# Patient Record
Sex: FEMALE | Race: WHITE | NOT HISPANIC OR LATINO | URBAN - METROPOLITAN AREA
[De-identification: names, ages, dates, MRNs, and addresses within clinical notes are randomized per-mention and may not be internally consistent; named-entity substitution may affect disease eponyms.]

---

## 2020-03-03 ENCOUNTER — OFFICE (OUTPATIENT)
Dept: URBAN - METROPOLITAN AREA CLINIC 64 | Facility: CLINIC | Age: 40
End: 2020-03-03
Payer: COMMERCIAL

## 2020-03-03 VITALS
WEIGHT: 182 LBS | HEIGHT: 66 IN | SYSTOLIC BLOOD PRESSURE: 122 MMHG | HEART RATE: 110 BPM | DIASTOLIC BLOOD PRESSURE: 86 MMHG

## 2020-03-03 DIAGNOSIS — F11.20 OPIOID DEPENDENCE, UNCOMPLICATED: ICD-10-CM

## 2020-03-03 DIAGNOSIS — R19.5 OTHER FECAL ABNORMALITIES: ICD-10-CM

## 2020-03-03 DIAGNOSIS — Z72.0 TOBACCO USE: ICD-10-CM

## 2020-03-03 DIAGNOSIS — F19.10 OTHER PSYCHOACTIVE SUBSTANCE ABUSE, UNCOMPLICATED: ICD-10-CM

## 2020-03-03 DIAGNOSIS — K21.9 GASTRO-ESOPHAGEAL REFLUX DISEASE WITHOUT ESOPHAGITIS: ICD-10-CM

## 2020-03-03 DIAGNOSIS — B18.2 CHRONIC VIRAL HEPATITIS C: ICD-10-CM

## 2020-03-03 DIAGNOSIS — R11.2 NAUSEA WITH VOMITING, UNSPECIFIED: ICD-10-CM

## 2020-03-03 DIAGNOSIS — R10.11 RIGHT UPPER QUADRANT PAIN: ICD-10-CM

## 2020-03-03 PROCEDURE — 99203 OFFICE O/P NEW LOW 30 MIN: CPT | Performed by: NURSE PRACTITIONER

## 2020-03-03 RX ORDER — LUBIPROSTONE 24 UG/1
48 CAPSULE, GELATIN COATED ORAL
Qty: 180 | Refills: 3 | Status: COMPLETED
Start: 2020-03-03 | End: 2021-05-19

## 2020-03-03 RX ORDER — ONDANSETRON HYDROCHLORIDE 4 MG/1
16 TABLET, FILM COATED ORAL
Qty: 40 | Refills: 1 | Status: ACTIVE
Start: 2020-03-03

## 2020-03-03 RX ORDER — OMEPRAZOLE 40 MG/1
40 CAPSULE, DELAYED RELEASE ORAL
Qty: 30 | Refills: 1 | Status: COMPLETED
Start: 2020-03-03 | End: 2021-05-19

## 2020-03-03 RX ORDER — SORBITOL SOLUTION 70 %
SOLUTION, ORAL MISCELLANEOUS
Qty: 100 | Refills: 0 | Status: COMPLETED
Start: 2020-03-03 | End: 2021-05-19

## 2020-05-07 ENCOUNTER — TELEHEALTH PROVIDED OTHER THAN IN PATIENT'S HOME (OUTPATIENT)
Dept: URBAN - METROPOLITAN AREA TELEHEALTH 4 | Facility: TELEHEALTH | Age: 40
End: 2020-05-07
Payer: COMMERCIAL

## 2020-05-07 VITALS — HEIGHT: 66 IN

## 2020-05-07 DIAGNOSIS — K74.69 OTHER CIRRHOSIS OF LIVER: ICD-10-CM

## 2020-05-07 DIAGNOSIS — B18.2 CHRONIC VIRAL HEPATITIS C: ICD-10-CM

## 2020-05-07 DIAGNOSIS — R74.8 ABNORMAL LEVELS OF OTHER SERUM ENZYMES: ICD-10-CM

## 2020-05-07 DIAGNOSIS — R60.0 LOCALIZED EDEMA: ICD-10-CM

## 2020-05-07 DIAGNOSIS — R53.83 OTHER FATIGUE: ICD-10-CM

## 2020-05-07 DIAGNOSIS — F19.10 OTHER PSYCHOACTIVE SUBSTANCE ABUSE, UNCOMPLICATED: ICD-10-CM

## 2020-05-07 DIAGNOSIS — R11.2 NAUSEA WITH VOMITING, UNSPECIFIED: ICD-10-CM

## 2020-05-07 PROCEDURE — 99213 OFFICE O/P EST LOW 20 MIN: CPT | Mod: 95 | Performed by: INTERNAL MEDICINE

## 2020-05-07 RX ORDER — SPIRONOLACTONE 50 MG/1
TABLET, FILM COATED ORAL
Qty: 90 | Refills: 5 | Status: ACTIVE
Start: 2020-05-07

## 2020-05-07 RX ORDER — VELPATASVIR AND SOFOSBUVIR 100; 400 MG/1; MG/1
TABLET, FILM COATED ORAL
Qty: 84 | Refills: 0 | Status: COMPLETED
Start: 2020-05-07 | End: 2021-05-19

## 2021-05-19 ENCOUNTER — OFFICE (OUTPATIENT)
Dept: URBAN - METROPOLITAN AREA CLINIC 64 | Facility: CLINIC | Age: 41
End: 2021-05-19
Payer: COMMERCIAL

## 2021-05-19 VITALS
WEIGHT: 212 LBS | HEIGHT: 66 IN | HEART RATE: 81 BPM | DIASTOLIC BLOOD PRESSURE: 80 MMHG | SYSTOLIC BLOOD PRESSURE: 117 MMHG

## 2021-05-19 DIAGNOSIS — R14.0 ABDOMINAL DISTENSION (GASEOUS): ICD-10-CM

## 2021-05-19 DIAGNOSIS — K21.9 GASTRO-ESOPHAGEAL REFLUX DISEASE WITHOUT ESOPHAGITIS: ICD-10-CM

## 2021-05-19 DIAGNOSIS — R10.11 RIGHT UPPER QUADRANT PAIN: ICD-10-CM

## 2021-05-19 DIAGNOSIS — F19.10 OTHER PSYCHOACTIVE SUBSTANCE ABUSE, UNCOMPLICATED: ICD-10-CM

## 2021-05-19 DIAGNOSIS — B18.2 CHRONIC VIRAL HEPATITIS C: ICD-10-CM

## 2021-05-19 DIAGNOSIS — R11.2 NAUSEA WITH VOMITING, UNSPECIFIED: ICD-10-CM

## 2021-05-19 DIAGNOSIS — K59.00 CONSTIPATION, UNSPECIFIED: ICD-10-CM

## 2021-05-19 PROCEDURE — 99214 OFFICE O/P EST MOD 30 MIN: CPT | Performed by: NURSE PRACTITIONER

## 2021-05-19 RX ORDER — ONDANSETRON 8 MG/1
24 TABLET, ORALLY DISINTEGRATING ORAL
Qty: 60 | Refills: 6 | Status: ACTIVE
Start: 2021-05-19

## 2021-06-07 ENCOUNTER — OFFICE (OUTPATIENT)
Dept: URBAN - METROPOLITAN AREA CLINIC 64 | Facility: CLINIC | Age: 41
End: 2021-06-07

## 2024-08-16 ENCOUNTER — OFFICE VISIT (OUTPATIENT)
Dept: ORTHOPEDIC SURGERY | Facility: CLINIC | Age: 44
End: 2024-08-16
Payer: OTHER MISCELLANEOUS

## 2024-08-16 VITALS — HEART RATE: 106 BPM | BODY MASS INDEX: 37.57 KG/M2 | HEIGHT: 67 IN | WEIGHT: 239.4 LBS

## 2024-08-16 DIAGNOSIS — M25.561 ACUTE PAIN OF RIGHT KNEE: ICD-10-CM

## 2024-08-16 DIAGNOSIS — M23.91 INTERNAL DERANGEMENT OF RIGHT KNEE: Primary | ICD-10-CM

## 2024-08-16 RX ORDER — IBUPROFEN 600 MG/1
600 TABLET ORAL EVERY 8 HOURS PRN
Qty: 90 TABLET | Refills: 0 | Status: SHIPPED | OUTPATIENT
Start: 2024-08-16 | End: 2024-09-15

## 2024-08-16 NOTE — PROGRESS NOTES
"NEW VISIT    Patient: Sangita Garcia  ?  YOB: 1980    MRN: [id number]  ?  Chief Complaint   Patient presents with    Right Knee - Initial Evaluation, Pain     Pain 5  DOI 5/25/2024      ?  HPI: Patient is a 43-year-old female presenting today for work related injury.  Work comp injury.  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  States felt a pop within her right knee, with initial swelling and inability to ambulate.  Subsequently evaluated at ER with x-rays obtained and negative for acute osseous abnormality.  Diagnosed with knee strain and advised conservative management.  Despite conservative treatment, including over-the-counter compression knee brace, prescription oral ibuprofen 800 mg every 6-8 hours, regular RICE treatment and activity modification, she has continued to have significant anterior and medial knee pain, that is worse with weightbearing activity, changing directions or squatting.  She is unable to kneel fully due to posterior medial knee pain.  Has associated repetitive popping and catching within this area as well.  Continues to have intermittent knee swelling, and substantial knee stiffness with difficulty in knee flexion past 90 degrees.  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  No prior knee surgeries.  No prior knee injuries before this event      Allergies:   Allergies   Allergen Reactions    Buprenorphine-Naloxone Hives       Past Medical History:   Diagnosis Date    Cholelithiases 06/2021    Constipation     GERD (gastroesophageal reflux disease)     Hepatitis C     treated 11/2020    History of MRSA infection 2004    arm wound \"started as boil\"    " "Insomnia     Nausea 06/2021     Past Surgical History:   Procedure Laterality Date    CHOLECYSTECTOMY N/A 7/8/2021    Procedure: CHOLECYSTECTOMY LAPAROSCOPIC;  Surgeon: Oscar Castaneda DO;  Location: [facility] MAIN OR;  Service: General;  Laterality: N/A;    HYSTERECTOMY  2004     Social History     Occupational History    Not on file   Tobacco Use    Smoking status: Every Day     Current packs/day: 1.00     Types: Cigarettes    Smokeless tobacco: Never    Tobacco comments:     do not smoke dos   Vaping Use    Vaping status: Never Used   Substance and Sexual Activity    Alcohol use: Not Currently    Drug use: Not Currently    Sexual activity: Defer     Partners: Male      Social History     Social History Narrative    Not on file     Family History   Problem Relation Age of Onset    COPD Father     Diabetes Father        Review of Systems  Constitutional: Negative.  Negative for fever.   Musculoskeletal: Positive for joint pain  Skin: Negative.  Negative for rash and wound.    Neurological: Negative for numbness.     Vitals:    08/16/24 1417   Pulse: 106   Weight: 109 kg (239 lb 6.4 oz)   Height: 170.2 cm (67\")        Physical Exam  Constitutional: Patient is oriented to person, place, and time. Appears well-developed and well-nourished.   Head: Normocephalic and atraumatic.   Pulmonary/Chest: Effort normal.   Musculoskeletal:   See detailed exam below   Neurological: Alert and oriented to person, place, and time. No sensory deficit. Coordination normal.   Skin: Skin is warm and dry. Capillary refill takes less than 2 seconds. No rash noted. No erythema.     The right knee is without obvious signs of acute bony deformity, quadriceps atrophy, ecchymosis.  There is palpable joint effusion in suprapatellar and posterior popliteal space.  Significant tenderness over medial joint space reproducible with meniscal testing including Jade and Apley compression.  There is also focal tenderness over MCL ligament reproducible " with valgus stress although without noted laxity compared with contralateral side.  Patella is with tenderness over medial facet and patellar tendon.  Associated patellar crepitus and mild grind.  Apprehension is negative with medial lateral glide.  Lateral joint line is nontender to palpation.  Soft tissue including the distal hamstring tendons, pes anserine, quad tendon, patellar tendon, proximal gastroc tendon, distal IT band, and Gerdy's tubercle are nontender.  Flexion extension 5-95 with discomfort at endrange over posterior medial joint space.  Knee and hip strength is 5/5 and uncomfortable.  Varus and valgus stress, Lachman, anterior drawer, posterior drawer are all negative.  Gait is painful and antalgic    Diagnostics:  XR - 2 Results     Results for orders placed during the hospital encounter of 05/29/24    XR KNEE 3 VW RIGHT 5/29/2024   and my interpretation of the image is as follows: No acute osseous abnormality.  Mild degenerative change of medial compartment       Assessment:  Diagnoses and all orders for this visit:    1. Internal derangement of right knee (Primary)  -     MRI Knee Right Without Contrast; Future    2. Acute pain of right knee  -     ibuprofen (ADVIL,MOTRIN) 600 MG tablet; Take 1 tablet by mouth Every 8 (Eight) Hours As Needed for Mild Pain for up to 30 days.  Dispense: 90 tablet; Refill: 0  -     MRI Knee Right Without Contrast; Future        Plan    Above diagnosis and plan discussed the patient office today.  Patient with acute flexion and likely valgus knee injury based on reported mechanism.  She has tenderness over MCL with reproducible pain with valgus stress, as well as significant posterior medial joint line symptoms concerning for potential medial meniscal injury in the setting of trauma.  At this point she has had minimal improvement with conservative management including regular oral prescription ibuprofen, knee bracing, activity modification and RICE treatment.   Continues to have significant decreased knee range of motion and mild knee swelling.  As such we discussed we will order MRI of her right knee to further evaluate for internal derangement, with focus on MCL and medial meniscus.  Will additionally fitted for Drytec hinged knee brace due to stability and support for weightbearing activity.  Additionally continue to encourage regular RICE treatment and ice 3-4 times daily for pain and swelling.  Additionally will refill ibuprofen 600 mg every 6-8 hours as needed for pain and swelling.  Will plan follow-up after MRI is obtained to review results and further treatment plan.  Activity modification and work restrictions discussed.  Patient was given a work note for work restrictions/accommodations.  At this time we will recommend light duty, included seated only work.  Will avoid prolonged standing, walking, any lifting or squatting.  Will update restrictions after MRI follow-up and further treatment plan per  Use of  Drytec knee brace  discussed and recommended with all weightbearing activity  Rest, ice, compression, and elevation (RICE) therapy  Follow up after MRI obtained to review results and further treatment plan    Patient recently discharged for treatment of the left lower extremity cellulitis with IV antibiotics transition to p.o.  Overall well-appearing on exam today with only mild erythema present in distal calf area which per patient has been improved since hospitalization.  At discharge from ED was recommended close outpatient follow-up with her PCP, which I strongly encouraged, to monitor resolution of infection and if further needed treatment.  Patient acknowledged recommendation, and will contact PCP for follow-up visit.    Date of encounter: 08/16/2024   Jayro Wills DO    Electronically signed by Jayro Wills DO, 08/16/24, 2:24 PM EDT.    Disclaimer: Please note that areas of this note were completed with computer voice recognition software.  Quite often  unanticipated grammatical, syntax, homophones, and other interpretive errors are inadvertently transcribed by the computer software. Please excuse any errors that have escaped final proofreading.

## 2024-08-19 ENCOUNTER — TELEPHONE (OUTPATIENT)
Dept: ORTHOPEDIC SURGERY | Facility: CLINIC | Age: 44
End: 2024-08-19

## 2024-08-23 ENCOUNTER — TELEPHONE (OUTPATIENT)
Dept: SPORTS MEDICINE | Facility: CLINIC | Age: 44
End: 2024-08-23

## 2024-09-04 ENCOUNTER — TELEPHONE (OUTPATIENT)
Dept: ORTHOPEDIC SURGERY | Facility: CLINIC | Age: 44
End: 2024-09-04

## 2024-09-04 NOTE — TELEPHONE ENCOUNTER
Caller: KIKA GARCIA    Relationship to patient:  NURSE     Best call back number: [phone]    Type of visit: R KNEE MRI FOLLOW UP    Requested date: ASAP       Additional notes: PT  NURSE  CALLING TO REQUEST R KNEE MRI FOLLOW UP APPT WITH DR MEZA. PLEASE CONTACT PT TO SCHEDULE APPT.

## 2024-09-05 NOTE — TELEPHONE ENCOUNTER
I do not see MRI completed yet. Can we please schedule MRI follow up after MRI completed and read. Thank you.

## 2024-09-10 ENCOUNTER — OFFICE VISIT (OUTPATIENT)
Dept: ORTHOPEDIC SURGERY | Facility: CLINIC | Age: 44
End: 2024-09-10
Payer: OTHER MISCELLANEOUS

## 2024-09-10 VITALS — BODY MASS INDEX: 37.51 KG/M2 | HEIGHT: 67 IN | WEIGHT: 239 LBS | OXYGEN SATURATION: 97 % | HEART RATE: 86 BPM

## 2024-09-10 DIAGNOSIS — M25.561 ACUTE PAIN OF RIGHT KNEE: ICD-10-CM

## 2024-09-10 DIAGNOSIS — S83.241A OTHER TEAR OF MEDIAL MENISCUS OF RIGHT KNEE AS CURRENT INJURY, INITIAL ENCOUNTER: Primary | ICD-10-CM

## 2024-09-10 PROCEDURE — 99214 OFFICE O/P EST MOD 30 MIN: CPT | Performed by: STUDENT IN AN ORGANIZED HEALTH CARE EDUCATION/TRAINING PROGRAM

## 2024-09-10 NOTE — PROGRESS NOTES
"FOLLOW UP VISIT    Patient: Sangita Garcia  ?  YOB: 1980    MRN: [id number]  ?  Chief Complaint   Patient presents with    Right Knee - Follow-up, Pain      ?  HPI:     Patient returning for follow-up of acute right knee injury and MRI review.  She has had some improvement with Drytec hinged knee brace with helps with stability and ambulation, but continues to have limiting medial joint pain, with mechanical symptoms including popping and catching, specifically with change of directions or squatting activity.  Continue intermittent swelling.  This continues to limit her daily activity and work.  Remains light duty/seated duty at work per prior office visit with work accommodation note.      Allergies:   Allergies   Allergen Reactions    Buprenorphine-Naloxone Hives       Past Medical History:   Diagnosis Date    Cholelithiases 06/2021    Constipation     GERD (gastroesophageal reflux disease)     Hepatitis C     treated 11/2020    History of MRSA infection 2004    arm wound \"started as boil\"    Insomnia     Nausea 06/2021     Past Surgical History:   Procedure Laterality Date    CHOLECYSTECTOMY N/A 7/8/2021    Procedure: CHOLECYSTECTOMY LAPAROSCOPIC;  Surgeon: Oscar Castaneda DO;  Location: [facility] OR;  Service: General;  Laterality: N/A;    HYSTERECTOMY  2004     Social History     Occupational History    Not on file   Tobacco Use    Smoking status: Every Day     Current packs/day: 1.00     Types: Cigarettes    Smokeless tobacco: Never    Tobacco comments:     do not smoke dos   Vaping Use    Vaping status: Never Used   Substance and Sexual Activity    Alcohol use: Not Currently    Drug use: Not Currently    Sexual activity: Defer     Partners: Male      Social History     Social History Narrative    Not on file     Family History   Problem Relation Age of Onset    COPD Father     Diabetes Father        Review of Systems  Constitutional: Negative.  Negative for fever.   Musculoskeletal: " "Positive for joint pain  Skin: Negative.  Negative for rash and wound.    Neurological: Negative for numbness.     Vitals:    09/10/24 1048   Pulse: 86   SpO2: 97%   Weight: 108 kg (239 lb)   Height: 170.2 cm (67\")        Physical Exam  Constitutional: Patient is oriented to person, place, and time. Appears well-developed and well-nourished.   Head: Normocephalic and atraumatic.   Pulmonary/Chest: Effort normal.   Musculoskeletal:   See detailed exam below   Neurological: Alert and oriented to person, place, and time. No sensory deficit. Coordination normal.   Skin: Skin is warm and dry. Capillary refill takes less than 2 seconds. No rash noted. No erythema.     The right knee is without obvious signs of acute bony deformity, quadriceps atrophy, ecchymosis.  Mild joint effusion.  Continue tenderness over medial joint space reproducible with meniscal testing including Jade and Apley compression.   Associated patellar crepitus and mild grind.  Apprehension is negative with medial lateral glide.  Lateral joint line is nontender to palpation.  Soft tissue including the distal hamstring tendons, pes anserine, quad tendon, patellar tendon, proximal gastroc tendon, distal IT band, and Gerdy's tubercle are nontender.  Flexion extension 0-100 with discomfort over medial compartment.  Knee and hip strength is 5/5 and uncomfortable.  Varus and valgus stress, Lachman, anterior drawer, posterior drawer are all negative.  Gait is painful and antalgic    Diagnostics:  Personally reviewed MRI report and images, completed at [facility] radiology on 8/28/2024, summary impression below:    No distinct signal in medial meniscus, but secondary signs of likely anterior root meniscal pathology, including meniscal extrusion, and reactive bone marrow edema over medial compartment.  Concern for anterior root meniscal tear    Assessment:  Diagnoses and all orders for this visit:    1. Other tear of medial meniscus of right knee as current " injury, initial encounter (Primary)    2. Acute pain of right knee          Plan    Patient returning for follow-up of acute right knee pain and MRI review.  We specifically discussed MRI report, images and the physiology in depth, specifically showing medial meniscal extrusion, and peripheral medial tibial bony edema concerning for likely anterior root unstable medial meniscal injury.  This clinically correlates with patient's symptoms, with continued mechanical medial knee pain, intermittent swelling, and limitations in ADL.  Has had mild improvement with hinged knee bracing, work restrictions and regular RICE treatment, although remains significantly limited in squatting, change direction or prolonged walking due to likely meniscal pathology.  Given MRI findings discussed and would recommend follow-up with my surgical orthopedic colleague Dr. Finley for discussion of potential meniscal repair versus partial meniscectomy.  Patient was agreeable, and appointment scheduled in office today.  Would continue prior work restrictions until follow-up with surgical orthopedics.  Specifically recommending light duty with seated work only, avoiding prolonged standing, walking, lifting or squatting  Use of  Drytec knee brace  discussed and recommended with all weightbearing activity  Rest, ice, compression, and elevation (RICE) therapy  Follow up with surgical orthopedics as discussed    Greater than 30 minutes spent in patient care, MRI review and discussion of treatment plan/care coordination    Date of encounter: 9/10/2024  Jayro Wills DO    Disclaimer: Please note that areas of this note were completed with computer voice recognition software.  Quite often unanticipated grammatical, syntax, homophones, and other interpretive errors are inadvertently transcribed by the computer software. Please excuse any errors that have escaped final proofreading.

## 2024-09-11 ENCOUNTER — TELEPHONE (OUTPATIENT)
Dept: ORTHOPEDIC SURGERY | Facility: CLINIC | Age: 44
End: 2024-09-11
Payer: MEDICAID

## 2024-09-11 NOTE — TELEPHONE ENCOUNTER
Caller: JOSÉ MANUEL    Relationship:     Best call back number: [phone]    What form or medical record are you requesting: OFFICE NOTES AND WORK STATUS FROM APPT 09/10/24    Who is requesting this form or medical record from you: [employer]    How would you like to receive the form or medical records (pick-up, mail, fax): FAX  If fax, what is the fax number: [phone]    Timeframe paperwork needed: ASAP

## 2024-09-23 ENCOUNTER — OFFICE VISIT (OUTPATIENT)
Dept: ORTHOPEDIC SURGERY | Facility: CLINIC | Age: 44
End: 2024-09-23
Payer: OTHER MISCELLANEOUS

## 2024-09-23 VITALS — HEART RATE: 89 BPM | HEIGHT: 67 IN | WEIGHT: 223.6 LBS | BODY MASS INDEX: 35.09 KG/M2

## 2024-09-23 DIAGNOSIS — M25.561 RIGHT KNEE PAIN, UNSPECIFIED CHRONICITY: Primary | ICD-10-CM

## 2024-09-23 PROCEDURE — 20610 DRAIN/INJ JOINT/BURSA W/O US: CPT | Performed by: ORTHOPAEDIC SURGERY

## 2024-09-23 PROCEDURE — 99213 OFFICE O/P EST LOW 20 MIN: CPT | Performed by: ORTHOPAEDIC SURGERY

## 2024-09-23 RX ORDER — TRIAMCINOLONE ACETONIDE 40 MG/ML
80 INJECTION, SUSPENSION INTRA-ARTICULAR; INTRAMUSCULAR ONCE
Status: COMPLETED | OUTPATIENT
Start: 2024-09-23 | End: 2024-09-23

## 2024-09-23 RX ADMIN — TRIAMCINOLONE ACETONIDE 80 MG: 40 INJECTION, SUSPENSION INTRA-ARTICULAR; INTRAMUSCULAR at 15:01

## 2024-10-28 ENCOUNTER — TELEPHONE (OUTPATIENT)
Dept: ORTHOPEDIC SURGERY | Facility: CLINIC | Age: 44
End: 2024-10-28
Payer: MEDICAID

## 2024-10-28 NOTE — TELEPHONE ENCOUNTER
Caller: [employer] KIKA GARCIA    Relationship: Payer - [place]/C [place]    Best call back number: [phone]    What form or medical record are you requesting: RELEASE TO WORK    Who is requesting this form or medical record from you: W/C    How would you like to receive the form or medical records (pick-up, mail, fax): [phone]    Timeframe paperwork needed: ASAP    Additional notes: LORIE CALLED AND STATED THAT THE PT NO LONGER WANTS TO HAVE TREATMENT AND IS WONDERING IF THEY CAN GET A NOTE RELEASING HER TO WORK AND THEY WILL ALLOW HER TO WORK SINCE.

## 2025-02-13 ENCOUNTER — TELEMEDICINE (OUTPATIENT)
Dept: FAMILY MEDICINE CLINIC | Facility: TELEHEALTH | Age: 45
End: 2025-02-13
Payer: MEDICAID

## 2025-02-13 VITALS — TEMPERATURE: 96.9 F | HEART RATE: 95 BPM

## 2025-02-13 DIAGNOSIS — K52.9 GASTROENTERITIS: Primary | ICD-10-CM

## 2025-02-13 DIAGNOSIS — J06.9 UPPER RESPIRATORY TRACT INFECTION, UNSPECIFIED TYPE: ICD-10-CM

## 2025-02-13 RX ORDER — ONDANSETRON 8 MG/1
8 TABLET, ORALLY DISINTEGRATING ORAL EVERY 8 HOURS PRN
Qty: 21 TABLET | Refills: 0 | Status: SHIPPED | OUTPATIENT
Start: 2025-02-13

## 2025-02-13 NOTE — LETTER
February 13, 2025     Patient: Sangita Garcia   YOB: 1980   Date of Visit: 2/13/2025       To Whom It May Concern:    It is my medical opinion that Sangita Garcia may return to work on Monday, February 17, 2025.  Please excuse her absence from work on Wednesday, February 12 through Sunday, February 16, 2025 due to illness.             Sincerely,        LIANET Kaye    CC: No Recipients

## 2025-02-13 NOTE — PROGRESS NOTES
"You have chosen to receive care through a telehealth visit.  Do you consent to use a video/audio connection for your medical care today? Yes     Patient or patient representative verbalized consent for the use of Ambient Listening during the visit with  LIANET Kaye for chart documentation. 2/13/2025  11:42 EST    CHIEF COMPLAINT  No chief complaint on file.        HPI  History of Present Illness  The patient is a 44-year-old female presenting via virtual visit with complaints of nausea, vomiting, and headache.    She began experiencing symptoms early yesterday morning, which included an episode of vomiting as she attempted to go to work. The most recent episode of vomiting occurred early this morning. She reports mild body aches, likely due to the physical strain from vomiting. She has not experienced any fever or diarrhea. She has missed work for the past two days due to her illness and requests a note for her employer.    Later in the day, she developed a severe headache, which has persisted. She does not have a history of migraines.       Review of Systems  See HPI    Past Medical History:   Diagnosis Date    Cholelithiases 06/2021    Constipation     GERD (gastroesophageal reflux disease)     Hepatitis C     treated 11/2020    History of MRSA infection 2004    arm wound \"started as boil\"    Insomnia     Nausea 06/2021       Family History   Problem Relation Age of Onset    COPD Father     Diabetes Father        Social History     Socioeconomic History    Marital status:    Tobacco Use    Smoking status: Every Day     Current packs/day: 1.00     Types: Cigarettes    Smokeless tobacco: Never    Tobacco comments:     do not smoke dos   Vaping Use    Vaping status: Never Used   Substance and Sexual Activity    Alcohol use: Not Currently    Drug use: Not Currently    Sexual activity: Defer     Partners: Male       Sangita Garcia  reports that she has been smoking cigarettes. She has never used " smokeless tobacco.             Pulse 95   Temp 96.9 °F (36.1 °C)     PHYSICAL EXAM  Physical Exam   Constitutional: She is oriented to person, place, and time. She appears well-developed and well-nourished. She does not have a sickly appearance. She appears ill.   pale   HENT:   Head: Normocephalic and atraumatic.   Pulmonary/Chest: Effort normal.  No respiratory distress.  Neurological: She is alert and oriented to person, place, and time.       Results for orders placed or performed during the hospital encounter of 02/13/25   HELGA FLU + SARS PCR    Collection Time: 02/13/25 12:16 PM    Specimen: Nasal Cavity; Swab   Result Value Ref Range    COVID19 Not Detected Not Detected - Ref. Range    POC Influenza A, Molecular Not Detected Negative / Not Detected    POC Influenza B, Molecular Not Detected Negative / Not Detected    Internal Control Passed Passed    Lot Number [id number]     Expiration Date 73,126        Diagnoses and all orders for this visit:    1. Gastroenteritis (Primary)  -     ondansetron ODT (ZOFRAN-ODT) 8 MG disintegrating tablet; Place 1 tablet on the tongue Every 8 (Eight) Hours As Needed for Nausea or Vomiting.  Dispense: 21 tablet; Refill: 0    2. Upper respiratory tract infection, unspecified type  -     HELGA FLU + SARS PCR; Future    --take medications as prescribed  --increase fluids, rest as needed, tylenol or ibuprofen for pain  --f/u in 2-3 days if no improvement      Assessment & Plan  1. Gastroenteritis.  She reports nausea and vomiting that began early yesterday morning. There is no fever or diarrhea. A COVID-19 and influenza test will be conducted to rule out these conditions. A prescription for Zofran 8 mg tablets has been sent to her pharmacy at [facility] in [place] [address] to manage her nausea. A work note has been provided, excusing her from work until Monday. If both tests return negative, the treatment plan will be to continue with Zofran.    2. Headache.  She reports a severe  headache that started after vomiting yesterday. There is no history of migraines.         FOLLOW-UP  As discussed during visit with PCP/[facility] if no improvement or Urgent Care/Emergency Department if worsening of symptoms    Patient verbalizes understanding of medication dosage, comfort measures, instructions for treatment and follow-up.    Trang Perez, APRN  02/13/2025  11:42 EST    Mode of Visit: Video  Location of patient: -OTHER-: [facility]  Location of provider: +HOME+  You have chosen to receive care through a telehealth visit.  The patient has signed the video visit consent form.  The visit included audio and video interaction. No technical issues occurred during this visit.    The use of a video visit has been reviewed with the patient and verbal informed consent has been obtained. Myself and Sangita Garcia     participated in this visit. The patient is located in [address] [place] [address] [place] [facility]  I am located in [place], [place]. [employer] and [employer] were utilized. I spent 8 minutes in the patient's chart for this visit.      Note Disclaimer: At [facility], we believe that sharing information builds trust and better   relationships. You are receiving this note because you recently visited [facility]. It is possible you   will see health information before a provider has talked with you about it. This kind of information can   be easy to misunderstand. To help you fully understand what it means for your health, we urge you to   discuss this note with your provider.

## 2025-04-24 ENCOUNTER — APPOINTMENT (OUTPATIENT)
Dept: CT IMAGING | Facility: HOSPITAL | Age: 45
End: 2025-04-24
Payer: MEDICAID

## 2025-04-24 ENCOUNTER — HOSPITAL ENCOUNTER (OUTPATIENT)
Facility: HOSPITAL | Age: 45
Discharge: HOME | End: 2025-04-24
Attending: EMERGENCY MEDICINE | Admitting: EMERGENCY MEDICINE
Payer: MEDICAID

## 2025-04-24 VITALS
RESPIRATION RATE: 14 BRPM | OXYGEN SATURATION: 94 % | TEMPERATURE: 98.1 F | HEIGHT: 67 IN | SYSTOLIC BLOOD PRESSURE: 114 MMHG | WEIGHT: 227 LBS | HEART RATE: 94 BPM | BODY MASS INDEX: 35.63 KG/M2 | DIASTOLIC BLOOD PRESSURE: 80 MMHG

## 2025-04-24 DIAGNOSIS — S32.010D COMPRESSION FRACTURE OF L1 VERTEBRA WITH ROUTINE HEALING, SUBSEQUENT ENCOUNTER: ICD-10-CM
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
## 2025-04-24 DIAGNOSIS — S30.1XXA CONTUSION OF ABDOMINAL WALL, INITIAL ENCOUNTER: ICD-10-CM

## 2025-04-24 DIAGNOSIS — S32.010A COMPRESSION FRACTURE OF L1 VERTEBRA, INITIAL ENCOUNTER: ICD-10-CM

## 2025-04-24 PROBLEM — S32.000A LUMBAR COMPRESSION FRACTURE: Status: ACTIVE | Noted: 2025-04-24

## 2025-04-24 LAB
ALBUMIN SERPL-MCNC: 4.4 G/DL (ref 3.5–5.2)
ALBUMIN/GLOB SERPL: 1.3 G/DL
ALP SERPL-CCNC: 113 U/L (ref 39–117)
ALT SERPL W P-5'-P-CCNC: 19 U/L (ref 1–33)
AMYLASE SERPL-CCNC: 41 U/L (ref 28–100)
ANION GAP SERPL CALCULATED.3IONS-SCNC: 12.5 MMOL/L (ref 5–15)
AST SERPL-CCNC: 27 U/L (ref 1–32)
BASOPHILS # BLD AUTO: 0.05 10*3/MM3 (ref 0–0.2)
BASOPHILS NFR BLD AUTO: 0.7 % (ref 0–1.5)
BILIRUB SERPL-MCNC: 0.4 MG/DL (ref 0–1.2)
BUN SERPL-MCNC: 11 MG/DL (ref 6–20)
BUN/CREAT SERPL: 15.5 (ref 7–25)
CALCIUM SPEC-SCNC: 9.4 MG/DL (ref 8.6–10.5)
CHLORIDE SERPL-SCNC: 100 MMOL/L (ref 98–107)
CO2 SERPL-SCNC: 24.5 MMOL/L (ref 22–29)
CREAT SERPL-MCNC: 0.71 MG/DL (ref 0.57–1)
DEPRECATED RDW RBC AUTO: 44 FL (ref 37–54)
EGFRCR SERPLBLD CKD-EPI 2021: 109 ML/MIN/1.73
EOSINOPHIL # BLD AUTO: 0.14 10*3/MM3 (ref 0–0.4)
EOSINOPHIL NFR BLD AUTO: 2 % (ref 0.3–6.2)
ERYTHROCYTE [DISTWIDTH] IN BLOOD BY AUTOMATED COUNT: 13.4 % (ref 12.3–15.4)
GLOBULIN UR ELPH-MCNC: 3.4 GM/DL
GLUCOSE SERPL-MCNC: 109 MG/DL (ref 65–99)
HCT VFR BLD AUTO: 39.9 % (ref 34–46.6)
HGB BLD-MCNC: 12.6 G/DL (ref 12–15.9)
IMM GRANULOCYTES # BLD AUTO: 0.08 10*3/MM3 (ref 0–0.05)
IMM GRANULOCYTES NFR BLD AUTO: 1.2 % (ref 0–0.5)
LIPASE SERPL-CCNC: 28 U/L (ref 13–60)
LYMPHOCYTES # BLD AUTO: 1.81 10*3/MM3 (ref 0.7–3.1)
LYMPHOCYTES NFR BLD AUTO: 26.2 % (ref 19.6–45.3)
MCH RBC QN AUTO: 28.3 PG (ref 26.6–33)
MCHC RBC AUTO-ENTMCNC: 31.6 G/DL (ref 31.5–35.7)
MCV RBC AUTO: 89.7 FL (ref 79–97)
MONOCYTES # BLD AUTO: 0.39 10*3/MM3 (ref 0.1–0.9)
MONOCYTES NFR BLD AUTO: 5.7 % (ref 5–12)
NEUTROPHILS NFR BLD AUTO: 4.43 10*3/MM3 (ref 1.7–7)
NEUTROPHILS NFR BLD AUTO: 64.2 % (ref 42.7–76)
NRBC BLD AUTO-RTO: 0 /100 WBC (ref 0–0.2)
PLATELET # BLD AUTO: 259 10*3/MM3 (ref 140–450)
PMV BLD AUTO: 9.9 FL (ref 6–12)
POTASSIUM SERPL-SCNC: 3.9 MMOL/L (ref 3.5–5.2)
PROT SERPL-MCNC: 7.8 G/DL (ref 6–8.5)
RBC # BLD AUTO: 4.45 10*6/MM3 (ref 3.77–5.28)
SODIUM SERPL-SCNC: 137 MMOL/L (ref 136–145)
WBC NRBC COR # BLD AUTO: 6.9 10*3/MM3 (ref 3.4–10.8)

## 2025-04-24 PROCEDURE — 71260 CT THORAX DX C+: CPT

## 2025-04-24 PROCEDURE — G0378 HOSPITAL OBSERVATION PER HR: HCPCS

## 2025-04-24 PROCEDURE — 25010000002 HYDROMORPHONE 1 MG/ML SOLUTION: Performed by: EMERGENCY MEDICINE

## 2025-04-24 PROCEDURE — 96375 TX/PRO/DX INJ NEW DRUG ADDON: CPT

## 2025-04-24 PROCEDURE — 36415 COLL VENOUS BLD VENIPUNCTURE: CPT

## 2025-04-24 PROCEDURE — 96376 TX/PRO/DX INJ SAME DRUG ADON: CPT

## 2025-04-24 PROCEDURE — 99213 OFFICE O/P EST LOW 20 MIN: CPT | Performed by: NURSE PRACTITIONER

## 2025-04-24 PROCEDURE — 99285 EMERGENCY DEPT VISIT HI MDM: CPT

## 2025-04-24 PROCEDURE — 25010000002 MORPHINE PER 10 MG: Performed by: EMERGENCY MEDICINE

## 2025-04-24 PROCEDURE — 74177 CT ABD & PELVIS W/CONTRAST: CPT

## 2025-04-24 PROCEDURE — 82150 ASSAY OF AMYLASE: CPT | Performed by: EMERGENCY MEDICINE

## 2025-04-24 PROCEDURE — 25510000001 IOPAMIDOL PER 1 ML: Performed by: EMERGENCY MEDICINE

## 2025-04-24 PROCEDURE — 25010000002 ONDANSETRON PER 1 MG: Performed by: EMERGENCY MEDICINE

## 2025-04-24 PROCEDURE — 83690 ASSAY OF LIPASE: CPT | Performed by: EMERGENCY MEDICINE

## 2025-04-24 PROCEDURE — 85025 COMPLETE CBC W/AUTO DIFF WBC: CPT | Performed by: EMERGENCY MEDICINE

## 2025-04-24 PROCEDURE — 25010000002 HYDROMORPHONE PER 4 MG: Performed by: PHYSICIAN ASSISTANT

## 2025-04-24 PROCEDURE — 80053 COMPREHEN METABOLIC PANEL: CPT | Performed by: EMERGENCY MEDICINE

## 2025-04-24 PROCEDURE — 25010000002 KETOROLAC TROMETHAMINE PER 15 MG: Performed by: PHYSICIAN ASSISTANT

## 2025-04-24 PROCEDURE — 96374 THER/PROPH/DIAG INJ IV PUSH: CPT

## 2025-04-24 RX ORDER — CYCLOBENZAPRINE HCL 10 MG
10 TABLET ORAL 3 TIMES DAILY
Qty: 28 TABLET | Refills: 0 | Status: SHIPPED | OUTPATIENT
Start: 2025-04-24

## 2025-04-24 RX ORDER — CYCLOBENZAPRINE HCL 10 MG
10 TABLET ORAL 3 TIMES DAILY
Status: DISCONTINUED | OUTPATIENT
Start: 2025-04-24 | End: 2025-04-24 | Stop reason: HOSPADM

## 2025-04-24 RX ORDER — HYDROCODONE BITARTRATE AND ACETAMINOPHEN 5; 325 MG/1; MG/1
1 TABLET ORAL EVERY 6 HOURS PRN
Qty: 12 TABLET | Refills: 0 | Status: SHIPPED | OUTPATIENT
Start: 2025-04-24

## 2025-04-24 RX ORDER — HYDROMORPHONE HYDROCHLORIDE 1 MG/ML
0.5 INJECTION, SOLUTION INTRAMUSCULAR; INTRAVENOUS; SUBCUTANEOUS
Refills: 0 | Status: DISCONTINUED | OUTPATIENT
Start: 2025-04-24 | End: 2025-04-24 | Stop reason: HOSPADM

## 2025-04-24 RX ORDER — LIDOCAINE 4 G/G
1 PATCH TOPICAL EVERY 24 HOURS
Start: 2025-04-24

## 2025-04-24 RX ORDER — ONDANSETRON 4 MG/1
4 TABLET, FILM COATED ORAL EVERY 8 HOURS PRN
COMMUNITY
End: 2025-04-24 | Stop reason: HOSPADM

## 2025-04-24 RX ORDER — SODIUM CHLORIDE 0.9 % (FLUSH) 0.9 %
10 SYRINGE (ML) INJECTION AS NEEDED
Status: DISCONTINUED | OUTPATIENT
Start: 2025-04-24 | End: 2025-04-24 | Stop reason: HOSPADM

## 2025-04-24 RX ORDER — ONDANSETRON 2 MG/ML
4 INJECTION INTRAMUSCULAR; INTRAVENOUS ONCE
Status: COMPLETED | OUTPATIENT
Start: 2025-04-24 | End: 2025-04-24

## 2025-04-24 RX ORDER — IOPAMIDOL 755 MG/ML
100 INJECTION, SOLUTION INTRAVASCULAR
Status: COMPLETED | OUTPATIENT
Start: 2025-04-24 | End: 2025-04-24

## 2025-04-24 RX ORDER — KETOROLAC TROMETHAMINE 30 MG/ML
15 INJECTION, SOLUTION INTRAMUSCULAR; INTRAVENOUS ONCE AS NEEDED
Status: COMPLETED | OUTPATIENT
Start: 2025-04-24 | End: 2025-04-24

## 2025-04-24 RX ORDER — LIDOCAINE 4 G/G
1 PATCH TOPICAL
Status: DISCONTINUED | OUTPATIENT
Start: 2025-04-24 | End: 2025-04-24 | Stop reason: HOSPADM

## 2025-04-24 RX ADMIN — IOPAMIDOL 100 ML: 755 INJECTION, SOLUTION INTRAVENOUS at 07:37

## 2025-04-24 RX ADMIN — CYCLOBENZAPRINE HYDROCHLORIDE 10 MG: 10 TABLET, FILM COATED ORAL at 14:40

## 2025-04-24 RX ADMIN — MORPHINE SULFATE 4 MG: 4 INJECTION, SOLUTION INTRAMUSCULAR; INTRAVENOUS at 06:41

## 2025-04-24 RX ADMIN — LIDOCAINE PAIN RELIEF 1 PATCH: 560 PATCH TOPICAL at 11:32

## 2025-04-24 RX ADMIN — Medication 10 ML: at 11:32

## 2025-04-24 RX ADMIN — HYDROMORPHONE HYDROCHLORIDE 0.5 MG: 1 INJECTION, SOLUTION INTRAMUSCULAR; INTRAVENOUS; SUBCUTANEOUS at 11:32

## 2025-04-24 RX ADMIN — ONDANSETRON 4 MG: 2 INJECTION, SOLUTION INTRAMUSCULAR; INTRAVENOUS at 08:25

## 2025-04-24 RX ADMIN — ONDANSETRON 4 MG: 2 INJECTION, SOLUTION INTRAMUSCULAR; INTRAVENOUS at 06:41

## 2025-04-24 RX ADMIN — KETOROLAC TROMETHAMINE 15 MG: 30 INJECTION, SOLUTION INTRAMUSCULAR at 14:35

## 2025-04-24 RX ADMIN — HYDROMORPHONE HYDROCHLORIDE 0.5 MG: 1 INJECTION, SOLUTION INTRAMUSCULAR; INTRAVENOUS; SUBCUTANEOUS at 08:26
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
## 2025-04-24 NOTE — CASE MANAGEMENT/SOCIAL WORK
Discharge Planning Assessment   Jerardo     Patient Name: Sangita Garcia  MRN: [id number]  Today's Date: 4/24/2025    Admit Date: 4/24/2025    Plan: From home with spouse.   Discharge Needs Assessment       Row Name 04/24/25 1532       Living Environment    People in Home spouse    Name(s) of People in Home Spouse Roman    Current Living Arrangements home    Potentially Unsafe Housing Conditions none    In the past 12 months has the electric, gas, oil, or water company threatened to shut off services in your home? No    Primary Care Provided by self    Provides Primary Care For no one    Family Caregiver if Needed spouse    Family Caregiver Names Spouse Roman    Quality of Family Relationships helpful;involved;supportive    Able to Return to Prior Arrangements yes       Resource/Environmental Concerns    Resource/Environmental Concerns none    Transportation Concerns none       Transportation Needs    In the past 12 months, has lack of transportation kept you from medical appointments or from getting medications? no    In the past 12 months, has lack of transportation kept you from meetings, work, or from getting things needed for daily living? No       Food Insecurity    Within the past 12 months, you worried that your food would run out before you got the money to buy more. Never true    Within the past 12 months, the food you bought just didn't last and you didn't have money to get more. Never true       Transition Planning    Patient/Family Anticipates Transition to home with family    Patient/Family Anticipated Services at Transition none    Transportation Anticipated car, drives self;family or friend will provide       Discharge Needs Assessment    Readmission Within the Last 30 Days no previous admission in last 30 days    Equipment Currently Used at Home none    Concerns to be Addressed no discharge needs identified;denies needs/concerns at this time    Do you want help finding or keeping work or a job? I do  not need or want help    Do you want help with school or training? For example, starting or completing job training or getting a high school diploma, GED or equivalent No    Anticipated Changes Related to Illness none    Equipment Needed After Discharge none    Provided Post Acute Provider List? N/A    Provided Post Acute Provider Quality & Resource List? N/A    Offered/Gave Vendor List no                   Discharge Plan       Row Name 04/24/25 1533       Plan    Plan From home with spouse.    Patient/Family in Agreement with Plan yes    Plan Comments CM met with patient and spouse Roman at the bedside to review discharge planning. Patient lives at home with spouse, is IADLs and drives. Spouse Roman to transport patient at d/c. Confirmed PCP, insurance, and pharmacy. Patient accepts [facility]. Patient denies any difficulty affording food, utilities, or medications. Patient is not current with any HHC/PT services. DC orders noted. TLSO brace delivered.      Row Name 04/24/25 1428       Plan    Plan Comments CM notified per nursing that patient needed TLSO brace. CM ensured order placed and sent message to [employer] for delivery and fitting of brace.               Expected Discharge Date and Time       Expected Discharge Date Expected Discharge Time    Apr 24, 2025            Demographic Summary       Row Name 04/24/25 1532       General Information    Admission Type observation    Arrived From emergency department    Required Notices Provided Observation Status Notice    Referral Source admission list    Reason for Consult discharge planning    Preferred Language English       Contact Information    Permission Granted to Share Info With     Contact Information Obtained for                    Functional Status       Row Name 04/24/25 1534       Functional Status    Usual Activity Tolerance good    Current Activity Tolerance moderate       Functional Status, IADL    Medications independent    Meal  Preparation independent    Housekeeping independent    Laundry independent    Shopping independent    If for any reason you need help with day-to-day activities such as bathing, preparing meals, shopping, managing finances, etc., do you get the help you need? I get all the help I need       Mental Status    General Appearance WDL WDL       Mental Status Summary    Recent Changes in Mental Status/Cognitive Functioning no changes             Caty Arceo RN    [phone]  [email or web address]

## 2025-04-24 NOTE — LETTER
April 24, 2025     Patient: Sangita Garcia   YOB: 1980   Date of Visit: 4/24/2025       To Whom It May Concern:    It is my medical opinion that Sangita Garcia should remain out of work until 5/2/25 .   Other restrictions: No lifting over 5 lbs until cleared by MD. No driving while taking narcotics.            Sincerely,        CHAD Lane RN
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
## 2025-04-24 NOTE — PLAN OF CARE
Goal Outcome Evaluation:  Plan of Care Reviewed With: patient        Progress: improving  Outcome Evaluation: Neurosurgery consulted and ordered TLSO brace to be worn at all times while out of bed. Brace at bedside. Neurosurgery ok dc home and follow up outpatient in 6 weeks. Also to follow up outpatient in 1 week with PCP.
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
## 2025-04-28 NOTE — CASE MANAGEMENT/SOCIAL WORK
Case Management Discharge Note      Final Note: Routine home    Provided Post Acute Provider List?: N/A  Provided Post Acute Provider Quality & Resource List?: N/A    Selected Continued Care - Discharged on 4/24/2025 Admission date: 4/24/2025 - Discharge disposition: Home or Self Care       Transportation Services  Private: Car    Final Discharge Disposition Code: 01 - home or self-care

## 2025-05-28 ENCOUNTER — TELEPHONE (OUTPATIENT)
Dept: NEUROSURGERY | Facility: CLINIC | Age: 45
End: 2025-05-28
Payer: OTHER GOVERNMENT

## 2025-05-28 NOTE — TELEPHONE ENCOUNTER
Called patient to remind her to complete the X-Rays that had been ordered on 4/24/25 before following up here in the office. The patient was not aware of an appointment with us or X-Ray orders. I did explain that our office had consulted her in the ED and she was to follow up in our office in 6 weeks with new X-rays. She stated that she will complete the X-rays before her appointment with us on 6/5/25.
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
## 2025-05-29 ENCOUNTER — HOSPITAL ENCOUNTER (OUTPATIENT)
Dept: GENERAL RADIOLOGY | Facility: HOSPITAL | Age: 45
Discharge: HOME | End: 2025-05-29
Admitting: NURSE PRACTITIONER
Payer: OTHER GOVERNMENT

## 2025-05-29 DIAGNOSIS — S32.010D COMPRESSION FRACTURE OF L1 VERTEBRA WITH ROUTINE HEALING, SUBSEQUENT ENCOUNTER: ICD-10-CM

## 2025-05-29 PROCEDURE — 72100 X-RAY EXAM L-S SPINE 2/3 VWS: CPT

## 2025-06-05 ENCOUNTER — OFFICE VISIT (OUTPATIENT)
Dept: NEUROSURGERY | Facility: CLINIC | Age: 45
End: 2025-06-05
Payer: OTHER GOVERNMENT

## 2025-06-05 VITALS
DIASTOLIC BLOOD PRESSURE: 84 MMHG | HEART RATE: 100 BPM | WEIGHT: 238 LBS | SYSTOLIC BLOOD PRESSURE: 127 MMHG | BODY MASS INDEX: 37.35 KG/M2 | RESPIRATION RATE: 18 BRPM | OXYGEN SATURATION: 100 % | HEIGHT: 67 IN

## 2025-06-05 DIAGNOSIS — S32.010D COMPRESSION FRACTURE OF L1 VERTEBRA WITH ROUTINE HEALING, SUBSEQUENT ENCOUNTER: Primary | ICD-10-CM

## 2025-06-05 RX ORDER — ONDANSETRON 4 MG/1
1 TABLET, FILM COATED ORAL 3 TIMES DAILY
COMMUNITY
Start: 2025-05-08

## 2025-06-05 RX ORDER — SPIRONOLACTONE 25 MG/1
1 TABLET ORAL DAILY
COMMUNITY
Start: 2025-05-28

## 2025-06-05 RX ORDER — IBUPROFEN 800 MG/1
TABLET, FILM COATED ORAL SEE ADMIN INSTRUCTIONS
COMMUNITY
Start: 2025-05-08

## 2025-06-16 ENCOUNTER — TREATMENT (OUTPATIENT)
Dept: PHYSICAL THERAPY | Facility: CLINIC | Age: 45
End: 2025-06-16
Payer: OTHER GOVERNMENT

## 2025-06-16 DIAGNOSIS — M54.50 LOW BACK PAIN, UNSPECIFIED BACK PAIN LATERALITY, UNSPECIFIED CHRONICITY, UNSPECIFIED WHETHER SCIATICA PRESENT: ICD-10-CM

## 2025-06-16 DIAGNOSIS — S32.010A CLOSED COMPRESSION FRACTURE OF BODY OF L1 VERTEBRA: Primary | ICD-10-CM
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
## 2025-06-18 ENCOUNTER — TELEPHONE (OUTPATIENT)
Dept: PHYSICAL THERAPY | Facility: CLINIC | Age: 45
End: 2025-06-18
Payer: OTHER GOVERNMENT
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
## 2025-06-23 ENCOUNTER — TELEPHONE (OUTPATIENT)
Dept: NEUROSURGERY | Facility: CLINIC | Age: 45
End: 2025-06-23
Payer: OTHER GOVERNMENT

## 2025-06-23 ENCOUNTER — TREATMENT (OUTPATIENT)
Dept: PHYSICAL THERAPY | Facility: CLINIC | Age: 45
End: 2025-06-23
Payer: OTHER GOVERNMENT

## 2025-06-23 DIAGNOSIS — S32.010A CLOSED COMPRESSION FRACTURE OF BODY OF L1 VERTEBRA: Primary | ICD-10-CM

## 2025-06-23 DIAGNOSIS — M54.50 LOW BACK PAIN, UNSPECIFIED BACK PAIN LATERALITY, UNSPECIFIED CHRONICITY, UNSPECIFIED WHETHER SCIATICA PRESENT: ICD-10-CM

## 2025-06-23 NOTE — PROGRESS NOTES
Physical Therapy Daily Treatment Note      Patient: Sangita Garcia   : 1980  Referring practitioner: LIANET Hernandez  Date of Initial Visit: Type: THERAPY  Noted: 2025  Today's Date: 2025  Patient seen for 2 sessions       Visit Diagnoses:    ICD-10-CM ICD-9-CM   1. Closed compression fracture of body of L1 vertebra  S32.010A 805.4   2. Low back pain, unspecified back pain laterality, unspecified chronicity, unspecified whether sciatica present  M54.50 724.2       Subjective No new complaints vs last visit. HEP going well.     Objective   See Exercise, Manual, and Modality Logs for complete treatment.       Assessment/Plan Good response to treatment today.      Timed:         Manual Therapy:         mins  59669;     Therapeutic Exercise:    23     mins  38778;     Neuromuscular Linnea:    8    mins  76519;    Therapeutic Activity:          mins  89249;     Gait Training:           mins  59552;     Ultrasound:          mins  86448;    Ionto                                   mins   89479  Self Care                            mins   67824      Un-Timed:  Electrical Stimulation:    15     mins  93460 ( );  Dry Needling          mins self-pay  Traction          mins 87447  Canalith Repos         mins 00846    Timed Treatment:   31   mins   Total Treatment:     46   mins      Hugh Pollock, PT, PT, DPT, OCS  [place] license: [id number]

## 2025-06-23 NOTE — TELEPHONE ENCOUNTER
Caller: JALYN    Relationship: SELF    Best call back number: [phone]      Requested Prescriptions:   Requested Prescriptions   cyclobenzaprine (FLEXERIL) 10 MG tablet    No prescriptions requested or ordered in this encounter        Pharmacy where request should be sent:      [facility] [id number] [facility], [place] - [address] - [phone]  - [phone] FX   [address] [place] [address]   Phone: [phone] Fax: [phone]       Last office visit with prescribing clinician: 6/5/2025   Last telemedicine visit with prescribing clinician: Visit date not found   Next office visit with prescribing clinician: Visit date not found         Does the patient have less than a 3 day supply:  [x] Yes  [] No        Nay Chicas MA   06/23/25 14:43 EDT         DELETE AFTER READING TO PATIENT: “Thank you for sharing this information with me. I will send a message to the clinical team. Please allow 48 hours for the clinical staff to follow up on this request.”

## 2025-06-24 RX ORDER — CYCLOBENZAPRINE HCL 10 MG
10 TABLET ORAL 3 TIMES DAILY
Qty: 45 TABLET | Refills: 0 | Status: SHIPPED | OUTPATIENT
Start: 2025-06-24

## 2025-06-24 NOTE — TELEPHONE ENCOUNTER
PATIENT CALLED BACK STATING PCP HAS FILLED PRESCRIPTION AND IS NOT NEEDING US TO FILL    PATIENT HUNG UP BEFORE I WAS ABLE TO READ THIS BACK TO HER    THANK YOU

## 2025-06-30 ENCOUNTER — TREATMENT (OUTPATIENT)
Dept: PHYSICAL THERAPY | Facility: CLINIC | Age: 45
End: 2025-06-30
Payer: OTHER GOVERNMENT

## 2025-06-30 DIAGNOSIS — S32.010A CLOSED COMPRESSION FRACTURE OF BODY OF L1 VERTEBRA: Primary | ICD-10-CM

## 2025-06-30 DIAGNOSIS — M54.50 LOW BACK PAIN, UNSPECIFIED BACK PAIN LATERALITY, UNSPECIFIED CHRONICITY, UNSPECIFIED WHETHER SCIATICA PRESENT: ICD-10-CM

## 2025-06-30 NOTE — PROGRESS NOTES
Physical Therapy Daily Treatment Note      Patient: Sangita Garcia   : 1980  Referring practitioner: LIANET Hernandez  Date of Initial Visit: Type: THERAPY  Noted: 2025  Today's Date: 2025  Patient seen for 3 sessions       Visit Diagnoses:    ICD-10-CM ICD-9-CM   1. Closed compression fracture of body of L1 vertebra  S32.010A 805.4   2. Low back pain, unspecified back pain laterality, unspecified chronicity, unspecified whether sciatica present  M54.50 724.2       Subjective Limited HEP lately, pt is helping to move her daughter to [place]. HEP going well.    Objective   See Exercise, Manual, and Modality Logs for complete treatment.       Assessment/Plan Good response to treatment, encouraged pt to increase HEP participation.      Timed:         Manual Therapy:         mins  16427;     Therapeutic Exercise:    30     mins  78101;     Neuromuscular Linnea:        mins  04965;    Therapeutic Activity:          mins  32941;     Gait Training:           mins  41401;     Ultrasound:          mins  37978;    Ionto                                   mins   34476  Self Care                            mins   90603      Un-Timed:  Electrical Stimulation:    15     mins  77107 ( );  Dry Needling          mins self-pay  Traction          mins 19710  Canalith Repos         mins 96833    Timed Treatment:   30   mins   Total Treatment:     45   mins      Hugh Pollock, PT, PT, DPT, OCS  [place] license: [id number]

## 2025-08-17 ENCOUNTER — TELEMEDICINE (OUTPATIENT)
Dept: FAMILY MEDICINE CLINIC | Facility: TELEHEALTH | Age: 45
End: 2025-08-17
Payer: OTHER GOVERNMENT

## 2025-08-17 DIAGNOSIS — U07.1 COVID-19 VIRUS INFECTION: Primary | ICD-10-CM

## 2025-08-17 RX ORDER — DEXTROMETHORPHAN HYDROBROMIDE AND PROMETHAZINE HYDROCHLORIDE 15; 6.25 MG/5ML; MG/5ML
5 SYRUP ORAL 4 TIMES DAILY PRN
Qty: 120 ML | Refills: 0 | Status: SHIPPED | OUTPATIENT
Start: 2025-08-17